# Patient Record
Sex: MALE | Race: WHITE | NOT HISPANIC OR LATINO | URBAN - METROPOLITAN AREA
[De-identification: names, ages, dates, MRNs, and addresses within clinical notes are randomized per-mention and may not be internally consistent; named-entity substitution may affect disease eponyms.]

---

## 2023-03-16 ENCOUNTER — TELEPHONE (OUTPATIENT)
Dept: UROLOGY | Facility: CLINIC | Age: 43
End: 2023-03-16
Payer: COMMERCIAL

## 2023-03-16 DIAGNOSIS — Z31.41 FERTILITY TESTING: Primary | ICD-10-CM

## 2023-03-16 NOTE — TELEPHONE ENCOUNTER
Spoke to pt. Discussed labs needed for upcoming appointment and provided phone numbers to schedule. Pt verbalized understanding.     Shawna Erickson, MSN RN

## 2023-03-16 NOTE — TELEPHONE ENCOUNTER
[facility] Call Center    Phone Message    May a detailed message be left on voicemail: yes     Reason for Call: Order(s): Other:   Reason for requested: labs  Date needed: prior to 6/1/23  Provider name:     Sending encounter to determine if pt needs labs prior- please reach out to brandie, thank you      Action Taken: Message routed to:  [facility] ([facility] uro    Travel Screening: Not Applicable

## 2023-04-08 NOTE — TELEPHONE ENCOUNTER
Action    Action Taken Request for recs/labs/testing sent to [facility] Fax: [phone]         MEDICAL RECORDS REQUEST   [facility]  Urology Clinic  [address] [place] [address]  PHONE: [phone]  Fax: [phone]        FUTURE VISIT INFORMATION                                                   Fercho Quintana, : 1980 scheduled for future visit at [facility]    APPOINTMENT INFORMATION:    Date: 23    Provider:  Caden    Reason for Visit/Diagnosis: infertility consult    REFERRAL INFORMATION:    Referring providers clinic:  [facility]    RECORDS REQUESTED FOR VISIT                                                     NOTES  STATUS/DETAILS   OFFICE NOTE from other specialist  NO   MEDICATION LIST  NO   INFERTILITY     ESTRADIOL  YES   FSH  yes   SEMEN ANALYSIS (LAST 2)  Yes, 2023   T  yes     PRE-VISIT CHECKLIST      Record collection complete YES

## 2023-05-24 ENCOUNTER — LAB (OUTPATIENT)
Dept: LAB | Facility: CLINIC | Age: 43
End: 2023-05-24
Payer: COMMERCIAL

## 2023-05-24 DIAGNOSIS — Z31.41 FERTILITY TESTING: ICD-10-CM

## 2023-05-24 LAB — FSH SERPL IRP2-ACNC: 6.9 MIU/ML (ref 1.5–12.4)

## 2023-05-24 PROCEDURE — 83001 ASSAY OF GONADOTROPIN (FSH): CPT

## 2023-05-24 PROCEDURE — 84270 ASSAY OF SEX HORMONE GLOBUL: CPT

## 2023-05-24 PROCEDURE — 84403 ASSAY OF TOTAL TESTOSTERONE: CPT

## 2023-05-24 PROCEDURE — 36415 COLL VENOUS BLD VENIPUNCTURE: CPT

## 2023-05-24 PROCEDURE — 82670 ASSAY OF TOTAL ESTRADIOL: CPT

## 2023-05-25 ENCOUNTER — PRE VISIT (OUTPATIENT)
Dept: UROLOGY | Facility: CLINIC | Age: 43
End: 2023-05-25
Payer: COMMERCIAL

## 2023-05-25 LAB — SHBG SERPL-SCNC: 73 NMOL/L (ref 11–80)

## 2023-05-25 NOTE — TELEPHONE ENCOUNTER
Reason for Visit: Consult to discuss fertility    Orders/Procedures/Records: SA scheduled, hormone labs in process    Contact Patient: n/a    Rooming Requirements: normal      Susanne Griffith LPN  05/25/23  12:16 PM

## 2023-05-26 LAB
TESTOST FREE SERPL-MCNC: 8.89 NG/DL
TESTOST SERPL-MCNC: 704 NG/DL (ref 240–950)

## 2023-05-30 ENCOUNTER — LAB (OUTPATIENT)
Dept: LAB | Facility: CLINIC | Age: 43
End: 2023-05-30
Payer: COMMERCIAL

## 2023-05-30 DIAGNOSIS — Z31.41 FERTILITY TESTING: ICD-10-CM

## 2023-05-30 PROCEDURE — 89322 SEMEN ANAL STRICT CRITERIA: CPT

## 2023-05-31 LAB — ESTRADIOL SERPL HS-MCNC: 59 PG/ML (ref 10–40)

## 2023-06-01 ENCOUNTER — PRE VISIT (OUTPATIENT)
Dept: UROLOGY | Facility: CLINIC | Age: 43
End: 2023-06-01

## 2023-06-01 ENCOUNTER — OFFICE VISIT (OUTPATIENT)
Dept: UROLOGY | Facility: CLINIC | Age: 43
End: 2023-06-01
Payer: COMMERCIAL

## 2023-06-01 ENCOUNTER — HEALTH MAINTENANCE LETTER (OUTPATIENT)
Age: 43
End: 2023-06-01

## 2023-06-01 VITALS
HEART RATE: 80 BPM | SYSTOLIC BLOOD PRESSURE: 118 MMHG | WEIGHT: 290 LBS | HEIGHT: 76 IN | BODY MASS INDEX: 35.31 KG/M2 | DIASTOLIC BLOOD PRESSURE: 74 MMHG

## 2023-06-01 DIAGNOSIS — Z31.41 FERTILITY TESTING: ICD-10-CM

## 2023-06-01 DIAGNOSIS — R86.8 TERATOSPERMIA: Primary | ICD-10-CM

## 2023-06-01 LAB
ABNORMAL SPERM MORPHOLOGY: 98
ABSTINENCE DAYS: 2 DAYS (ref 2–7)
AGGLUTINATION: NO
ANALYSIS TEMP - CENTIGRADE: 21 CENTIGRADE
COLLECTION METHOD: ABNORMAL
COLLECTION SITE: ABNORMAL
CONSENT TO RELEASE TO PARTNER: YES
DAL- RECEIVED TIME: ABNORMAL
HEAD DEFECT: 98 %
IMMOTILE: 46 %
LIQUEFIED: YES
MIDPIECE DEFECT: 36 %
NON-PROGRESSIVE MOTILITY: 3 %
NORMAL SPERM MORPHOLOGY: 2 % NORMAL FORMS
PROGRESSIVE MOTILITY: 51 %
ROUND CELLS: 0.2 MILLION/ML
SPECIMEN PH: 7.2 PH
SPECIMEN VOLUME: 3.8 ML
SPERM CONCENTRATION: 37 MILLION/ML
TAIL DEFECT: 18 %
TIME OF ANALYSIS: ABNORMAL
TOTAL PROGRESSIVE MOTILE NUMBER: 72 MILLION
TOTAL SPERM NUMBER: 141 MILLION
VISCOUS: NO
VITALITY: ABNORMAL

## 2023-06-01 PROCEDURE — 99203 OFFICE O/P NEW LOW 30 MIN: CPT | Performed by: UROLOGY

## 2023-06-01 ASSESSMENT — PAIN SCALES - GENERAL: PAINLEVEL: NO PAIN (0)

## 2023-06-01 NOTE — NURSING NOTE
"Chief Complaint   Patient presents with     Consult     Discuss fertility       Blood pressure 118/74, pulse 80, height 1.93 m (6' 4\"), weight 131.5 kg (290 lb). Body mass index is 35.3 kg/m .    There is no problem list on file for this patient.      No Known Allergies    Current Outpatient Medications   Medication Sig Dispense Refill     metFORMIN (GLUCOPHAGE) 1000 MG tablet Take 1,000 mg by mouth 2 times daily (with meals)         Social History     Tobacco Use     Smoking status: Never     Smokeless tobacco: Never     Tobacco comments:     Used to smoke casually, but quit over 10 years ago   Substance Use Topics     Alcohol use: Never     Drug use: Never       Susanne Griffith LPN  6/1/2023  2:30 PM  "
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
## 2023-06-16 DIAGNOSIS — N52.9 ED (ERECTILE DYSFUNCTION): Primary | ICD-10-CM

## 2023-06-16 RX ORDER — SILDENAFIL 50 MG/1
TABLET, FILM COATED ORAL
Qty: 30 TABLET | Refills: 3 | Status: SHIPPED | OUTPATIENT
Start: 2023-06-16

## 2024-06-16 ENCOUNTER — HEALTH MAINTENANCE LETTER (OUTPATIENT)
Age: 44
End: 2024-06-16

## 2024-07-30 ENCOUNTER — OFFICE VISIT (OUTPATIENT)
Dept: PODIATRY | Facility: CLINIC | Age: 44
End: 2024-07-30
Payer: COMMERCIAL

## 2024-07-30 ENCOUNTER — ANCILLARY PROCEDURE (OUTPATIENT)
Dept: GENERAL RADIOLOGY | Facility: CLINIC | Age: 44
End: 2024-07-30
Attending: PODIATRIST
Payer: COMMERCIAL

## 2024-07-30 VITALS — SYSTOLIC BLOOD PRESSURE: 127 MMHG | DIASTOLIC BLOOD PRESSURE: 80 MMHG | HEART RATE: 80 BPM

## 2024-07-30 DIAGNOSIS — M21.6X1 PRONATION DEFORMITY OF BOTH FEET: Primary | ICD-10-CM

## 2024-07-30 DIAGNOSIS — M21.6X2 PRONATION DEFORMITY OF BOTH FEET: Primary | ICD-10-CM

## 2024-07-30 DIAGNOSIS — M77.52 BURSITIS OF LEFT FOOT: ICD-10-CM

## 2024-07-30 DIAGNOSIS — M79.671 PAIN IN BOTH FEET: ICD-10-CM

## 2024-07-30 DIAGNOSIS — M79.672 PAIN IN BOTH FEET: ICD-10-CM

## 2024-07-30 DIAGNOSIS — M21.6X2 PRONATION DEFORMITY OF BOTH FEET: ICD-10-CM

## 2024-07-30 DIAGNOSIS — M21.6X1 PRONATION DEFORMITY OF BOTH FEET: ICD-10-CM

## 2024-07-30 PROCEDURE — 73630 X-RAY EXAM OF FOOT: CPT | Mod: TC | Performed by: RADIOLOGY

## 2024-07-30 PROCEDURE — 99204 OFFICE O/P NEW MOD 45 MIN: CPT | Performed by: PODIATRIST

## 2024-07-30 NOTE — LETTER
7/30/2024      Fercho Quintana  [address]  [place] [address]      Dear Colleague,    Thank you for referring your patient, Fercho Quintana, to the [facility]. Please see a copy of my visit note below.    S:  Complains of bilateral chronic foot pain.  Has had this for years.  The left is worse than the right.  Slowly getting worse.  Points to left medial arch.  States his arches are quite collapsed.  He is on his feet somewhat at times.  He is relieved this by buying new shoes every month.  He tried orthotics in the past.  There was somewhat helpful.  He also has a mass on the medial portion of his left arch.  He denies blistering or skin breakdown or drainage.    ROS:  see above     No Known Allergies    Current Outpatient Medications   Medication Sig Dispense Refill     metFORMIN (GLUCOPHAGE) 1000 MG tablet Take 1,000 mg by mouth 2 times daily (with meals)       sildenafil (VIAGRA) 50 MG tablet Take 50-100mg 1 hour prior. 30 tablet 3       There is no problem list on file for this patient.      No past medical history on file.    No past surgical history on file.    Family History   Problem Relation Age of Onset     Hypertension Mother      Hypertension Father      Cancer Maternal Grandfather      Cancer Paternal Grandfather      Diabetes Paternal Grandmother        Social History     Tobacco Use     Smoking status: Never     Smokeless tobacco: Never     Tobacco comments:     Used to smoke casually, but quit over 10 years ago   Substance Use Topics     Alcohol use: Never         Exam:  Vitals: /80   Pulse 80   BMI: There is no height or weight on file to calculate BMI.  Height: Data Unavailable    Constitutional/ general:  Pt is in no apparent distress, appears well-nourished.  Cooperative with history and physical exam.     Psych:  The patient answered questions appropriately.  Normal affect.  Seems to have reasonable expectations, in terms of treatment.     Lungs:   Non labored breathing, non labored speech. No cough.  No audible wheezing. Even, quiet breathing.       Vascular:  Pedal pulses are palpable bilaterally for both the DP and PT arteries.  CFT < 3 sec.  No edema.  No varicosities.  Pedal hair growth noted.     Neuro:  Alert and oriented x 3. Coordinated gait.  Light touch sensation is intact to the L4, L5, S1 distributions. No obvious deficits.  No evidence of neurological-based weakness, spasticity, or contracture in the lower extremities.    Derm: Normal texture and turgor.  No erythema, ecchymosis, or cyanosis.  No open lesions.     Musculoskeletal:    Lower extremity muscle strength is normal.  Patient is ambulatory without an assistive device or brace.  No gross deformities.  Normal ROM all fore foot and rearfoot joints.  No equinus.  With weightbearing patient has bilateral pronation with left being worse.  No pain with ROM.   With both feet in neutral position can barely get to 90 degrees.  No pain with range of motion or palpation at this time.  Bursal sac noted plantar medial left TN joint.  No pain with palpation of this.  No numbness.  No blistering or skin breakdown.    Radiographic: Consistent with above    A:    Bilateral pes planovalgus left greater than right  Bilateral foot pain left greater than right  Left plantar medial bursitis    P:  X-rays taken today of both feet.  Personally reviewed x-rays.  Discussed additional support for pronated foot.  Wrote prescription for P wearing orthotics.  Will offload prominent TN joint on left orthotic.  Also discussed Vitaliy brace for the left foot.  We placed an order for this.  Discussed importance of keeping lower extremities supple.  Will refer to physical therapy.  We discussed surgery today.  Discussed flatfoot reconstruction involved and may be 4 to 6 months before he can return to work walking a lot.  Briefly discussed different assortment of osteotomies, tendon procedures, fusions which may be used in  flatfoot reconstruction.  A decision was made today to pursue conservative treatments first.  RTC as needed.    Bryce May DPM, FACFAS             Again, thank you for allowing me to participate in the care of your patient.        Sincerely,        Bryce May DPM

## 2024-07-30 NOTE — PATIENT INSTRUCTIONS
We wish you continued good healing. If you have any questions or concerns, please do not hesitate to contact us at  [phone]    [employer] (secure e-mail communication and access to your chart) to send a message or to make an appointment.    Please remember to call and schedule a follow up appointment if one was recommended at your earliest convenience.     PODIATRY CLINIC HOURS  TELEPHONE NUMBER    Dr. Bryce CAMERONPJED FACFAS        Clinics:  [facility]   [place]  Tuesday 1PM-6PM  [place]  Wednesday 745AM-330PM  [place]  Monday 2nd,4th  830AM-4PM  Thursday/Friday 745AM-230PM     [place] APPOINTMENTS  [phone]    [place] APPOINTMENTS  [phone]          If you need a medication refill, please contact us you may need lab work and/or a follow up visit prior to your refill (i.e. Antifungal medications).  If MRI needed please call Imaging at [phone]   HOW DO I GET MY KNEE SCOOTER? Knee scooters can be picked up at ANY Medical Supply stores with your knee scooter Prescription.  OR  Bring your signed prescription to an [facility] showroom.   Set up an appointment for your custom Orthotics. Call any Orthotics locations call [phone]

## 2024-07-31 NOTE — PROGRESS NOTES
S:  Complains of bilateral chronic foot pain.  Has had this for years.  The left is worse than the right.  Slowly getting worse.  Points to left medial arch.  States his arches are quite collapsed.  He is on his feet somewhat at times.  He is relieved this by buying new shoes every month.  He tried orthotics in the past.  There was somewhat helpful.  He also has a mass on the medial portion of his left arch.  He denies blistering or skin breakdown or drainage.    ROS:  see above     No Known Allergies    Current Outpatient Medications   Medication Sig Dispense Refill    metFORMIN (GLUCOPHAGE) 1000 MG tablet Take 1,000 mg by mouth 2 times daily (with meals)      sildenafil (VIAGRA) 50 MG tablet Take 50-100mg 1 hour prior. 30 tablet 3       There is no problem list on file for this patient.      No past medical history on file.    No past surgical history on file.    Family History   Problem Relation Age of Onset    Hypertension Mother     Hypertension Father     Cancer Maternal Grandfather     Cancer Paternal Grandfather     Diabetes Paternal Grandmother        Social History     Tobacco Use    Smoking status: Never    Smokeless tobacco: Never    Tobacco comments:     Used to smoke casually, but quit over 10 years ago   Substance Use Topics    Alcohol use: Never         Exam:  Vitals: /80   Pulse 80   BMI: There is no height or weight on file to calculate BMI.  Height: Data Unavailable    Constitutional/ general:  Pt is in no apparent distress, appears well-nourished.  Cooperative with history and physical exam.     Psych:  The patient answered questions appropriately.  Normal affect.  Seems to have reasonable expectations, in terms of treatment.     Lungs:  Non labored breathing, non labored speech. No cough.  No audible wheezing. Even, quiet breathing.       Vascular:  Pedal pulses are palpable bilaterally for both the DP and PT arteries.  CFT < 3 sec.  No edema.  No varicosities.  Pedal hair growth noted.      Neuro:  Alert and oriented x 3. Coordinated gait.  Light touch sensation is intact to the L4, L5, S1 distributions. No obvious deficits.  No evidence of neurological-based weakness, spasticity, or contracture in the lower extremities.    Derm: Normal texture and turgor.  No erythema, ecchymosis, or cyanosis.  No open lesions.     Musculoskeletal:    Lower extremity muscle strength is normal.  Patient is ambulatory without an assistive device or brace.  No gross deformities.  Normal ROM all fore foot and rearfoot joints.  No equinus.  With weightbearing patient has bilateral pronation with left being worse.  No pain with ROM.   With both feet in neutral position can barely get to 90 degrees.  No pain with range of motion or palpation at this time.  Bursal sac noted plantar medial left TN joint.  No pain with palpation of this.  No numbness.  No blistering or skin breakdown.    Radiographic: Consistent with above    A:    Bilateral pes planovalgus left greater than right  Bilateral foot pain left greater than right  Left plantar medial bursitis    P:  X-rays taken today of both feet.  Personally reviewed x-rays.  Discussed additional support for pronated foot.  Wrote prescription for P wearing orthotics.  Will offload prominent TN joint on left orthotic.  Also discussed Vitaliy brace for the left foot.  We placed an order for this.  Discussed importance of keeping lower extremities supple.  Will refer to physical therapy.  We discussed surgery today.  Discussed flatfoot reconstruction involved and may be 4 to 6 months before he can return to work walking a lot.  Briefly discussed different assortment of osteotomies, tendon procedures, fusions which may be used in flatfoot reconstruction.  A decision was made today to pursue conservative treatments first.  RTC as needed.    Bryce May DPM, FACFAS

## 2025-06-21 ENCOUNTER — HEALTH MAINTENANCE LETTER (OUTPATIENT)
Age: 45
End: 2025-06-21